# Patient Record
Sex: MALE | Race: WHITE | NOT HISPANIC OR LATINO | Employment: FULL TIME | URBAN - METROPOLITAN AREA
[De-identification: names, ages, dates, MRNs, and addresses within clinical notes are randomized per-mention and may not be internally consistent; named-entity substitution may affect disease eponyms.]

---

## 2021-04-29 ENCOUNTER — PATIENT MESSAGE (OUTPATIENT)
Dept: RESEARCH | Facility: HOSPITAL | Age: 56
End: 2021-04-29

## 2024-06-06 DIAGNOSIS — M77.8 ELBOW TENDONITIS: Primary | ICD-10-CM

## 2024-06-17 ENCOUNTER — CLINICAL SUPPORT (OUTPATIENT)
Dept: REHABILITATION | Facility: HOSPITAL | Age: 59
End: 2024-06-17
Payer: COMMERCIAL

## 2024-06-17 DIAGNOSIS — M77.8 ELBOW TENDONITIS: Primary | ICD-10-CM

## 2024-06-17 PROCEDURE — 97810 ACUP 1/> WO ESTIM 1ST 15 MIN: CPT | Mod: PN

## 2024-06-17 PROCEDURE — 97811 ACUP 1/> W/O ESTIM EA ADD 15: CPT | Mod: PN

## 2024-06-18 NOTE — PROGRESS NOTES
Acupuncture Evaluation Note     Name: Demian Daly  [facility] Number: [id number]    Traditional Chinese Medicine (TCM) Diagnosis: Qi Stagnation  Medical Diagnosis:   Encounter Diagnosis   Name Primary?    Elbow tendonitis Yes        Evaluation Date: 6/17/24    Visit #/Visits authorized: 1/12    Precautions: Standard    Subjective     Chief Concern: R Elbow pain due to 'bowler's elbow' (has been bowling for 50+ years he says) and wants to continue to do so. He's willing to modify his bowling form so as to avoid most of the twisting motion and only do so at the end -    Medical necessity is demonstrated by the following IMPAIRMENTS: Medical Necessity: Decreased mobility limits day to day activities, social, and emergent situations              Aggravating Factors:  movement and pressure   Relieving Factors:  rest    Symptom Description:     Quality:  Aching  Severity:  6  Frequency:  when active    Previous Treatments Tried:  Medication    HEENT:  not noted     Chest:  not noted    Digestion:     Diet: not asked   Fluids:  no unusual history of alcohol, caffeine, or dehydration , ,   Taste/Appetite: normal   Symptoms: no blood in stool    Sleep: no problems noted    Energy Levels:  not noted    Psychological Symptoms:  not noted    Other Symptoms: Not noted  GYN Symptoms: none    Objective     Observation: clean and calm    Tongue:      Body:  normal   Color:  pink   Coating:  thin,     Pulse:        wiry       New Findings:  none    Treatment     Treatment Principles:  Move Qi    Acupuncture points used:  K3, UB 62, SI3/4, TB 5, GB 20   Needles In: 10  Needles Out: 10  Needles W/O STIM placed: 8:45am  Needles W/O STIM removed: 9:30am      Other Traditional Chinese Medicine Modalities - none    Assessment     After treatment, patient felt more relaxed and less strain    Patient prognosis is Good.     Patient will continue to benefit from acupuncture treatment to address the deficits listed in the problem list box on  initial evaluation, provide patient family education and to maximize pt's level of independence in the home and community environment.     Patient's spiritual, cultural and educational needs considered and pt agreeable to plan of care and goals.     Anticipated barriers to treatment: none    Plan     Recommend every week or two for 12 sessions with midway re-assess.      Education:  Patient is aware of cumulative benefit of acupuncture

## 2024-06-25 ENCOUNTER — CLINICAL SUPPORT (OUTPATIENT)
Dept: REHABILITATION | Facility: HOSPITAL | Age: 59
End: 2024-06-25
Payer: COMMERCIAL

## 2024-06-25 DIAGNOSIS — M77.8 ELBOW TENDONITIS: Primary | ICD-10-CM

## 2024-06-25 PROCEDURE — 97810 ACUP 1/> WO ESTIM 1ST 15 MIN: CPT | Mod: PN

## 2024-06-25 PROCEDURE — 97811 ACUP 1/> W/O ESTIM EA ADD 15: CPT | Mod: PN

## 2024-06-27 NOTE — PROGRESS NOTES
Acupuncture Evaluation Note     Name: Demian Daly  [facility] Number: [id number]    Traditional Chinese Medicine (TCM) Diagnosis: Qi Stagnation  Medical Diagnosis:   Encounter Diagnosis   Name Primary?    Elbow tendonitis Yes        Evaluation Date: 6/25/24    Visit #/Visits authorized: 2/12    Precautions: Standard    Subjective     Chief Concern: R Elbow pain due to 'bowler's elbow' (has been bowling for 50+ years he says) and wants to continue to do so. He's willing to modify his bowling form so as to avoid most of the twisting motion and only do so at the end -    Medical necessity is demonstrated by the following IMPAIRMENTS: Medical Necessity: Decreased mobility limits day to day activities, social, and emergent situations              Aggravating Factors:  movement and pressure   Relieving Factors:  rest    Symptom Description:     Quality:  Aching  Severity:  6  Frequency:  when active    Previous Treatments Tried:  Medication    HEENT:  not noted     Chest:  not noted    Digestion:     Diet: not asked   Fluids:  no unusual history of alcohol, caffeine, or dehydration , ,   Taste/Appetite: normal   Symptoms: no blood in stool    Sleep: no problems noted    Energy Levels:  not noted    Psychological Symptoms:  not noted    Other Symptoms: Not noted  GYN Symptoms: none    Objective     Observation: clean and calm    Tongue:      Body:  normal   Color:  pink   Coating:  thin,     Pulse:        wiry       New Findings:  none    Treatment     Treatment Principles:  Move Qi    Acupuncture points used:  K3, UB 62, SI3/4, TB 5, GB 20   Needles In: 10  Needles Out: 10  Needles W/O STIM placed: 11am  [place] W/O STIM removed:11:45am      Other Traditional Chinese Medicine Modalities - none    Assessment     After treatment, patient felt more relaxed and less strain    Patient prognosis is Good.     Patient will continue to benefit from acupuncture treatment to address the deficits listed in the problem list box on  initial evaluation, provide patient family education and to maximize pt's level of independence in the home and community environment.     Patient's spiritual, cultural and educational needs considered and pt agreeable to plan of care and goals.     Anticipated barriers to treatment: none    Plan     Recommend every week or two for 12 sessions with midway re-assess.      Education:  Patient is aware of cumulative benefit of acupuncture

## 2024-07-09 ENCOUNTER — CLINICAL SUPPORT (OUTPATIENT)
Dept: REHABILITATION | Facility: HOSPITAL | Age: 59
End: 2024-07-09
Payer: COMMERCIAL

## 2024-07-09 DIAGNOSIS — M77.8 ELBOW TENDONITIS: Primary | ICD-10-CM

## 2024-07-09 PROCEDURE — 97811 ACUP 1/> W/O ESTIM EA ADD 15: CPT | Mod: PN

## 2024-07-09 PROCEDURE — 97810 ACUP 1/> WO ESTIM 1ST 15 MIN: CPT | Mod: PN

## 2024-07-10 NOTE — PROGRESS NOTES
Acupuncture Evaluation Note     Name: Demian Daly  [facility] Number: [id number]    Traditional Chinese Medicine (TCM) Diagnosis: Qi Stagnation  Medical Diagnosis:   Encounter Diagnosis   Name Primary?    Elbow tendonitis Yes        Evaluation Date: 7/9/24    Visit #/Visits authorized: 3/12    Precautions: Standard    Subjective     Chief Concern: R Elbow pain due to 'bowler's elbow' (has been bowling for 50+ years he says) and wants to continue to do so. He's willing to modify his bowling form so as to avoid most of the twisting motion and only do so at the end -    Medical necessity is demonstrated by the following IMPAIRMENTS: Medical Necessity: Decreased mobility limits day to day activities, social, and emergent situations              Aggravating Factors:  movement and pressure   Relieving Factors:  rest    Symptom Description:     Quality:  Aching  Severity:  6  Frequency:  when active    Previous Treatments Tried:  Medication    HEENT:  not noted     Chest:  not noted    Digestion:     Diet: not asked   Fluids:  no unusual history of alcohol, caffeine, or dehydration , ,   Taste/Appetite: normal   Symptoms: no blood in stool    Sleep: no problems noted    Energy Levels:  not noted    Psychological Symptoms:  not noted    Other Symptoms: Not noted  GYN Symptoms: none    Objective     Observation: clean and calm    Tongue:      Body:  normal   Color:  pink   Coating:  thin,     Pulse:        wiry       New Findings:  none    Treatment     Treatment Principles:  Move Qi    Acupuncture points used:  K3, UB 62, SI3/4, TB 5, GB 20   Needles In: 10  Needles Out: 10  Needles W/O STIM placed: 1:30pm  [place] W/O STIM removed:2:15pm      Other Traditional Chinese Medicine Modalities - none    Assessment     After treatment, patient felt more relaxed and less strain    Patient prognosis is Good.     Patient will continue to benefit from acupuncture treatment to address the deficits listed in the problem list box on  initial evaluation, provide patient family education and to maximize pt's level of independence in the home and community environment.     Patient's spiritual, cultural and educational needs considered and pt agreeable to plan of care and goals.     Anticipated barriers to treatment: none    Plan     Recommend every week or two for 12 sessions with midway re-assess.      Education:  Patient is aware of cumulative benefit of acupuncture

## 2024-07-16 ENCOUNTER — CLINICAL SUPPORT (OUTPATIENT)
Dept: REHABILITATION | Facility: HOSPITAL | Age: 59
End: 2024-07-16
Payer: COMMERCIAL

## 2024-07-16 DIAGNOSIS — M77.8 ELBOW TENDONITIS: Primary | ICD-10-CM

## 2024-07-16 PROCEDURE — 97810 ACUP 1/> WO ESTIM 1ST 15 MIN: CPT | Mod: PN

## 2024-07-16 PROCEDURE — 97811 ACUP 1/> W/O ESTIM EA ADD 15: CPT | Mod: PN

## 2024-07-19 NOTE — PROGRESS NOTES
Acupuncture Evaluation Note     Name: Demian Daly  [facility] Number: [id number]    Traditional Chinese Medicine (TCM) Diagnosis: Qi Stagnation  Medical Diagnosis:   Encounter Diagnosis   Name Primary?    Elbow tendonitis Yes        Evaluation Date: 7/16/24    Visit #/Visits authorized: 4/12    Precautions: Standard    Subjective     Chief Concern: R Elbow pain due to 'bowler's elbow' (has been bowling for 50+ years he says) and wants to continue to do so. He's willing to modify his bowling form so as to avoid most of the twisting motion and only do so at the end -    Medical necessity is demonstrated by the following IMPAIRMENTS: Medical Necessity: Decreased mobility limits day to day activities, social, and emergent situations              Aggravating Factors:  movement and pressure   Relieving Factors:  rest    Symptom Description:     Quality:  Aching  Severity:  6  Frequency:  when active    Previous Treatments Tried:  Medication    HEENT:  not noted     Chest:  not noted    Digestion:     Diet: not asked   Fluids:  no unusual history of alcohol, caffeine, or dehydration , ,   Taste/Appetite: normal   Symptoms: no blood in stool    Sleep: no problems noted    Energy Levels:  not noted    Psychological Symptoms:  not noted    Other Symptoms: Not noted  GYN Symptoms: none    Objective     Observation: clean and calm    Tongue:      Body:  normal   Color:  pink   Coating:  thin,     Pulse:        wiry       New Findings:  none    Treatment     Treatment Principles:  Move Qi    Acupuncture points used:  K3, UB 62, SI3/4, TB 5, GB 20   Needles In: 10  Needles Out: 10  Needles W/O STIM placed: 1:30pm  [place] W/O STIM removed:2:15pm      Other Traditional Chinese Medicine Modalities - none    Assessment     After treatment, patient felt more relaxed and less strain    Patient prognosis is Good.     Patient will continue to benefit from acupuncture treatment to address the deficits listed in the problem list box on  initial evaluation, provide patient family education and to maximize pt's level of independence in the home and community environment.     Patient's spiritual, cultural and educational needs considered and pt agreeable to plan of care and goals.     Anticipated barriers to treatment: none    Plan     Recommend every week or two for 12 sessions with midway re-assess.      Education:  Patient is aware of cumulative benefit of acupuncture

## 2024-07-30 ENCOUNTER — CLINICAL SUPPORT (OUTPATIENT)
Dept: REHABILITATION | Facility: HOSPITAL | Age: 59
End: 2024-07-30
Payer: COMMERCIAL

## 2024-07-30 DIAGNOSIS — M77.8 ELBOW TENDONITIS: Primary | ICD-10-CM

## 2024-07-30 PROCEDURE — 97810 ACUP 1/> WO ESTIM 1ST 15 MIN: CPT | Mod: PN

## 2024-07-30 PROCEDURE — 97811 ACUP 1/> W/O ESTIM EA ADD 15: CPT | Mod: PN

## 2024-08-01 NOTE — PROGRESS NOTES
Acupuncture Evaluation Note     Name: Demian Daly  [facility] Number: [id number]    Traditional Chinese Medicine (TCM) Diagnosis: Qi Stagnation  Medical Diagnosis:   Encounter Diagnosis   Name Primary?    Elbow tendonitis Yes        Evaluation Date: 7/30/24    Visit #/Visits authorized: 5/12    Precautions: Standard    Subjective     Chief Concern: R Elbow pain due to 'bowler's elbow' (has been bowling for 50+ years he says) and wants to continue to do so. He's willing to modify his bowling form so as to avoid most of the twisting motion and only do so at the end -    Medical necessity is demonstrated by the following IMPAIRMENTS: Medical Necessity: Decreased mobility limits day to day activities, social, and emergent situations              Aggravating Factors:  movement and pressure   Relieving Factors:  rest    Symptom Description:     Quality:  Aching  Severity:  6  Frequency:  when active    Previous Treatments Tried:  Medication    HEENT:  not noted     Chest:  not noted    Digestion:     Diet: not asked   Fluids:  no unusual history of alcohol, caffeine, or dehydration , ,   Taste/Appetite: normal   Symptoms: no blood in stool    Sleep: no problems noted    Energy Levels:  not noted    Psychological Symptoms:  not noted    Other Symptoms: Not noted  GYN Symptoms: none    Objective     Observation: clean and calm    Tongue:      Body:  normal   Color:  pink   Coating:  thin,     Pulse:        wiry       New Findings:  none    Treatment     Treatment Principles:  Move Qi    Acupuncture points used:  K3, UB 62, SI3/4, TB 5, GB 20   Needles In: 10  Needles Out: 10  Needles W/O STIM placed: 1:30pm  [place] W/O STIM removed:2:15pm      Other Traditional Chinese Medicine Modalities - none    Assessment     After treatment, patient felt more relaxed and less strain    Patient prognosis is Good.     Patient will continue to benefit from acupuncture treatment to address the deficits listed in the problem list box on  initial evaluation, provide patient family education and to maximize pt's level of independence in the home and community environment.     Patient's spiritual, cultural and educational needs considered and pt agreeable to plan of care and goals.     Anticipated barriers to treatment: none    Plan     Recommend every week or two for 12 sessions with midway re-assess.      Education:  Patient is aware of cumulative benefit of acupuncture

## 2024-08-06 ENCOUNTER — CLINICAL SUPPORT (OUTPATIENT)
Dept: REHABILITATION | Facility: HOSPITAL | Age: 59
End: 2024-08-06
Payer: COMMERCIAL

## 2024-08-06 DIAGNOSIS — M77.8 ELBOW TENDONITIS: Primary | ICD-10-CM

## 2024-08-06 PROCEDURE — 97813 ACUP 1/> W/ESTIM 1ST 15 MIN: CPT | Mod: PN

## 2024-08-06 PROCEDURE — 97810 ACUP 1/> WO ESTIM 1ST 15 MIN: CPT | Mod: PN

## 2024-08-13 ENCOUNTER — CLINICAL SUPPORT (OUTPATIENT)
Facility: HOSPITAL | Age: 59
End: 2024-08-13
Payer: COMMERCIAL

## 2024-08-13 DIAGNOSIS — M77.8 ELBOW TENDONITIS: Primary | ICD-10-CM

## 2024-08-13 PROCEDURE — 97810 ACUP 1/> WO ESTIM 1ST 15 MIN: CPT | Mod: PO

## 2024-08-13 PROCEDURE — 97811 ACUP 1/> W/O ESTIM EA ADD 15: CPT | Mod: PO

## 2024-08-14 NOTE — PROGRESS NOTES
Acupuncture Evaluation Note     Name: Demian Daly  [facility] [place] Number: [id number]    Traditional Chinese Medicine (TCM) Diagnosis: Qi Stagnation  Medical Diagnosis:   Encounter Diagnosis   Name Primary?    Elbow tendonitis Yes        Evaluation Date: 8/13/24    Visit #/Visits authorized: 7/12    Precautions: Standard    Subjective     Chief Concern: R Elbow pain due to 'bowler's elbow' (has been bowling for 50+ years he says) and wants to continue to do so. He's willing to modify his bowling form so as to avoid most of the twisting motion and only do so at the end -    Medical necessity is demonstrated by the following IMPAIRMENTS: Medical Necessity: Decreased mobility limits day to day activities, social, and emergent situations              Aggravating Factors:  movement and pressure   Relieving Factors:  rest    Symptom Description:     Quality:  Aching  Severity:  6  Frequency:  when active    Previous Treatments Tried:  Medication    HEENT:  not noted     Chest:  not noted    Digestion:     Diet: not asked   Fluids:  no unusual history of alcohol, caffeine, or dehydration , ,   Taste/Appetite: normal   Symptoms: no blood in stool    Sleep: no problems noted    Energy Levels:  not noted    Psychological Symptoms:  not noted    Other Symptoms: Not noted  GYN Symptoms: none    Objective     Observation: clean and calm    Tongue:      Body:  normal   Color:  pink   Coating:  thin,     Pulse:        wiry       New Findings:  none    Treatment     Treatment Principles:  Move Qi    Acupuncture points used:  K3, UB 62, SI3/4, TB 5, GB 20   Needles In: 10  Needles Out: 10  Needles W/O STIM placed: 1:30pm  [place] W/O STIM removed: 2pm      Other Traditional Chinese Medicine Modalities - none    Assessment     After treatment, patient felt more relaxed and less strain    Patient prognosis is Good.     Patient will continue to benefit from acupuncture treatment to address the deficits listed in the problem list box on  initial evaluation, provide patient family education and to maximize pt's level of independence in the home and community environment.     Patient's spiritual, cultural and educational needs considered and pt agreeable to plan of care and goals.     Anticipated barriers to treatment: none    Plan     Recommend every week or two for 12 sessions with midway re-assess.      Education:  Patient is aware of cumulative benefit of acupuncture

## 2024-08-27 ENCOUNTER — CLINICAL SUPPORT (OUTPATIENT)
Facility: HOSPITAL | Age: 59
End: 2024-08-27
Payer: COMMERCIAL

## 2024-08-27 DIAGNOSIS — M77.8 ELBOW TENDONITIS: Primary | ICD-10-CM

## 2024-08-27 PROCEDURE — 97813 ACUP 1/> W/ESTIM 1ST 15 MIN: CPT | Mod: PO

## 2024-08-27 PROCEDURE — 97810 ACUP 1/> WO ESTIM 1ST 15 MIN: CPT | Mod: PO

## 2024-08-28 NOTE — PROGRESS NOTES
Acupuncture Evaluation Note     Name: Demian Daly  [facility] Number: [id number]    Traditional Chinese Medicine (TCM) Diagnosis: Qi Stagnation  Medical Diagnosis:   Encounter Diagnosis   Name Primary?    Elbow tendonitis Yes        Evaluation Date: 8/27/24    Visit #/Visits authorized: 8/12    Precautions: Standard    Subjective     Chief Concern: R Elbow pain due to 'bowler's elbow' (has been bowling for 50+ years he says) and wants to continue to do so. He's willing to modify his bowling form so as to avoid most of the twisting motion and only do so at the end -    Medical necessity is demonstrated by the following IMPAIRMENTS: Medical Necessity: Decreased mobility limits day to day activities, social, and emergent situations              Aggravating Factors:  movement and pressure   Relieving Factors:  rest    Symptom Description:     Quality:  Aching  Severity:  6  Frequency:  when active    Previous Treatments Tried:  Medication    HEENT:  not noted     Chest:  not noted    Digestion:     Diet: not asked   Fluids:  no unusual history of alcohol, caffeine, or dehydration , ,   Taste/Appetite: normal   Symptoms: no blood in stool    Sleep: no problems noted    Energy Levels:  not noted    Psychological Symptoms:  not noted    Other Symptoms: Not noted  GYN Symptoms: none    Objective     Observation: clean and calm    Tongue:      Body:  normal   Color:  pink   Coating:  thin,     Pulse:        wiry       New Findings:  none    Treatment     Treatment Principles:  Move Qi    Acupuncture points used:  K3, UB 62, SI3/4, TB 5, GB 20   Needles In: 10  Needles Out: 10  Needles W/E STIM placed: 1:30pm  [place] W/O STIM removed: 2pm      Other Traditional Chinese Medicine Modalities - none    Assessment     After treatment, patient felt more relaxed and less strain    Patient prognosis is Good.     Patient will continue to benefit from acupuncture treatment to address the deficits listed in the problem list box on  initial evaluation, provide patient family education and to maximize pt's level of independence in the home and community environment.     Patient's spiritual, cultural and educational needs considered and pt agreeable to plan of care and goals.     Anticipated barriers to treatment: none    Plan     Recommend every week or two for 12 sessions with midway re-assess.      Education:  Patient is aware of cumulative benefit of acupuncture